# Patient Record
(demographics unavailable — no encounter records)

---

## 2025-02-10 NOTE — HISTORY OF PRESENT ILLNESS
[FreeTextEntry1] : This is a 71 y/o female with a pmhx of GERD, Anxiety, PVCs coming in today for follow up. Patient was seen by Dr. Rojas 8/2024 s/p ED visit for spinning sensation with nausea, diagnosed with vertigo.  MR brain with IAC 8/2024 No acute infarct, hemorrhage, or mass effect. No evidence of vestibular schwannoma. She reports no recent episodes.  Patient was seen by ortho, was told she has space between vertigo which presses on the nerve.  Patient reports increased stress as her mother was diagnosed with cognitive issue. She reports difficulty sleeping 2/2 stress.  Patient denies chest pain, dyspnea, palpitations, dizziness, syncope, changes in bowel/bladder habits or appetite.

## 2025-07-30 NOTE — HISTORY OF PRESENT ILLNESS
[FreeTextEntry1] : This is a 71 y/o female with a pmhx of GERD, Anxiety, PVCs coming in today for follow-up. Last OV 02/10/2025. Patient reported increased stress regarding her mother who was admitted to [facility] on 07/24/2025 for small blood clots in bilateral legs and lungs and discharged on 07/27/2025. Patient states that she feels well at this time. No complaints.   Denies chest pain, palpitations, diaphoresis, vision changes, HA, dizziness, syncope, cough, wheezing, edema, fever, chills, infection.